# Patient Record
Sex: MALE | Race: WHITE
[De-identification: names, ages, dates, MRNs, and addresses within clinical notes are randomized per-mention and may not be internally consistent; named-entity substitution may affect disease eponyms.]

---
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
## 2019-06-06 ENCOUNTER — HOSPITAL (OUTPATIENT)
Dept: OTHER | Age: 19
End: 2019-06-06
Attending: EMERGENCY MEDICINE

## 2019-06-12 PROBLEM — S62.336A CLOSED DISPLACED FRACTURE OF NECK OF FIFTH METACARPAL BONE OF RIGHT HAND, INITIAL ENCOUNTER: Status: ACTIVE | Noted: 2019-06-12
